# Patient Record
Sex: MALE | Race: WHITE
[De-identification: names, ages, dates, MRNs, and addresses within clinical notes are randomized per-mention and may not be internally consistent; named-entity substitution may affect disease eponyms.]

---

## 2019-04-20 ENCOUNTER — HOSPITAL ENCOUNTER (EMERGENCY)
Dept: HOSPITAL 91 - E/R | Age: 34
LOS: 3 days | Discharge: HOME | End: 2019-04-23
Payer: COMMERCIAL

## 2019-04-20 ENCOUNTER — HOSPITAL ENCOUNTER (EMERGENCY)
Dept: HOSPITAL 91 - E/R | Age: 34
Discharge: HOME | End: 2019-04-20
Payer: COMMERCIAL

## 2019-04-20 ENCOUNTER — HOSPITAL ENCOUNTER (EMERGENCY)
Dept: HOSPITAL 10 - E/R | Age: 34
LOS: 3 days | Discharge: HOME | End: 2019-04-23
Payer: COMMERCIAL

## 2019-04-20 ENCOUNTER — HOSPITAL ENCOUNTER (EMERGENCY)
Dept: HOSPITAL 10 - E/R | Age: 34
Discharge: HOME | End: 2019-04-20
Payer: COMMERCIAL

## 2019-04-20 VITALS — DIASTOLIC BLOOD PRESSURE: 78 MMHG | SYSTOLIC BLOOD PRESSURE: 114 MMHG | RESPIRATION RATE: 18 BRPM | HEART RATE: 62 BPM

## 2019-04-20 VITALS — HEIGHT: 60 IN | WEIGHT: 194.01 LBS | BODY MASS INDEX: 38.09 KG/M2

## 2019-04-20 VITALS — BODY MASS INDEX: 34.63 KG/M2 | HEIGHT: 60 IN | WEIGHT: 176.37 LBS

## 2019-04-20 DIAGNOSIS — R45.851: Primary | ICD-10-CM

## 2019-04-20 DIAGNOSIS — F17.210: ICD-10-CM

## 2019-04-20 DIAGNOSIS — R45.850: Primary | ICD-10-CM

## 2019-04-20 LAB
ACETAMINOPHEN: < 10 UG/ML (ref 10–30)
ADD MAN DIFF?: NO
ADD UMIC: NO
ALANINE AMINOTRANSFERASE: 97 IU/L (ref 13–69)
ALBUMIN/GLOBULIN RATIO: 1.09
ALBUMIN: 4.6 G/DL (ref 3.3–4.9)
ALKALINE PHOSPHATASE: 92 IU/L (ref 42–121)
AMPHETAMINE/METHAMPHETAMINE: POSITIVE
ANION GAP: 7 (ref 5–13)
ASPARTATE AMINO TRANSFERASE: 77 IU/L (ref 15–46)
BASOPHIL #: 0.1 10^3/UL (ref 0–0.1)
BASOPHILS %: 0.7 % (ref 0–2)
BILIRUBIN,DIRECT: 0 MG/DL (ref 0–0.2)
BILIRUBIN,TOTAL: 0.9 MG/DL (ref 0.2–1.3)
BLOOD UREA NITROGEN: 22 MG/DL (ref 7–20)
CALCIUM: 10 MG/DL (ref 8.4–10.2)
CARBON DIOXIDE: 26 MMOL/L (ref 21–31)
CHLORIDE: 107 MMOL/L (ref 97–110)
CREATININE: 1.14 MG/DL (ref 0.61–1.24)
EOSINOPHILS #: 0.1 10^3/UL (ref 0–0.5)
EOSINOPHILS %: 1.8 % (ref 0–7)
ETHANOL: < 10 MG/DL (ref 0–0)
GLOBULIN: 4.2 G/DL (ref 1.3–3.2)
GLUCOSE: 97 MG/DL (ref 70–220)
HEMATOCRIT: 37.4 % (ref 42–52)
HEMOGLOBIN: 12.4 G/DL (ref 14–18)
IMMATURE GRANS #M: 0.02 10^3/UL (ref 0–0.03)
IMMATURE GRANS % (M): 0.3 % (ref 0–0.43)
INR: 1.13
LYMPHOCYTES #: 1.9 10^3/UL (ref 0.8–2.9)
LYMPHOCYTES %: 28.4 % (ref 15–51)
MEAN CORPUSCULAR HEMOGLOBIN: 30.4 PG (ref 29–33)
MEAN CORPUSCULAR HGB CONC: 33.2 G/DL (ref 32–37)
MEAN CORPUSCULAR VOLUME: 91.7 FL (ref 82–101)
MEAN PLATELET VOLUME: 10.4 FL (ref 7.4–10.4)
MONOCYTE #: 1 10^3/UL (ref 0.3–0.9)
MONOCYTES %: 14.1 % (ref 0–11)
NEUTROPHIL #: 3.7 10^3/UL (ref 1.6–7.5)
NEUTROPHILS %: 54.7 % (ref 39–77)
NUCLEATED RED BLOOD CELLS #: 0 10^3/UL (ref 0–0)
NUCLEATED RED BLOOD CELLS%: 0 /100WBC (ref 0–0)
PARTIAL THROMBOPLASTIN TIME: 28.8 SEC (ref 23–35)
PLATELET COUNT: 219 10^3/UL (ref 140–415)
POTASSIUM: 3.9 MMOL/L (ref 3.5–5.1)
PROTIME: 14.6 SEC (ref 11.9–14.9)
PT RATIO: 1.1
RED BLOOD COUNT: 4.08 10^6/UL (ref 4.7–6.1)
RED CELL DISTRIBUTION WIDTH: 13.8 % (ref 11.5–14.5)
SALICYLATE: < 1 MG/DL (ref 5–30)
SODIUM: 140 MMOL/L (ref 135–144)
TOTAL PROTEIN: 8.8 G/DL (ref 6.1–8.1)
UR ASCORBIC ACID: 20 MG/DL
UR BILIRUBIN (DIP): NEGATIVE MG/DL
UR BLOOD (DIP): NEGATIVE MG/DL
UR CLARITY: CLEAR
UR COLOR: YELLOW
UR GLUCOSE (DIP): NEGATIVE MG/DL
UR KETONES (DIP): NEGATIVE MG/DL
UR LEUKOCYTE ESTERASE (DIP): NEGATIVE LEU/UL
UR NITRITE (DIP): NEGATIVE MG/DL
UR PH (DIP): 6 (ref 5–9)
UR SPECIFIC GRAVITY (DIP): 1.03 (ref 1–1.03)
UR TOTAL PROTEIN (DIP): NEGATIVE MG/DL
UR UROBILINOGEN (DIP): NEGATIVE MG/DL
WHITE BLOOD COUNT: 6.8 10^3/UL (ref 4.8–10.8)

## 2019-04-20 PROCEDURE — 99282 EMERGENCY DEPT VISIT SF MDM: CPT

## 2019-04-20 PROCEDURE — 80307 DRUG TEST PRSMV CHEM ANLYZR: CPT

## 2019-04-20 PROCEDURE — 85610 PROTHROMBIN TIME: CPT

## 2019-04-20 PROCEDURE — 80053 COMPREHEN METABOLIC PANEL: CPT

## 2019-04-20 PROCEDURE — 85025 COMPLETE CBC W/AUTO DIFF WBC: CPT

## 2019-04-20 PROCEDURE — 81003 URINALYSIS AUTO W/O SCOPE: CPT

## 2019-04-20 PROCEDURE — 96374 THER/PROPH/DIAG INJ IV PUSH: CPT

## 2019-04-20 PROCEDURE — 99285 EMERGENCY DEPT VISIT HI MDM: CPT

## 2019-04-20 PROCEDURE — 85730 THROMBOPLASTIN TIME PARTIAL: CPT

## 2019-04-20 NOTE — ERD
ER Documentation


Chief Complaint


Chief Complaint





headache and neck pain after trying to hand himself in detention with shirt





HPI


This is a 33-year-old male that had been taken to [place] detention.  While he was 


there the patient stated he want to kill himself.  He attempted to hang himself 


in detention with a shirt however this was stopped by police as they noticed the 


patient wrapping the shirt around his neck.  There is no coughing choking or 


gagging episode is again the attendant was stopped by police.  The patient is 


expressing suicidal thoughts and has the ideation of wanting to hang himself.  


The patient contrary to the triage note is denying a headache or neck pain.  He 


has had no fevers or shaking or chills.  [employer] place the patient on a hold.





ROS


All systems reviewed and are negative except as per history of present illness.





Allergies


Allergies:  


Coded Allergies:  


     No Known Allergy (Unverified , 4/20/19)





PMhx/Soc


Medical and Surgical Hx:  pt denies Medical Hx, pt denies Surgical Hx


Hx Alcohol Use:  Yes (OCCATIONAL)


Hx Substance Use:  No


Hx Tobacco Use:  Yes (OCCATIONAL)


Smoking Status:  Light tobacco smoker





Physical Exam


Vitals





Vital Signs


  Date      Temp  Pulse  Resp  B/P (MAP)   Pulse Ox  O2          O2 Flow    FiO2


Time                                                 Delivery    Rate


   4/20/19  98.8     70    16      126/84        99


     14:00                           (98)





Physical Exam


Constitutional:Well-developed. Well-nourished.


HEENT:Normocephalic. Atraumatic.Pupils were equal round reactive to light. Moist


mucous membranes.No tonsillar exudates.


Neck: No nuchal rigidity. No lymphadenopathy. No posterior cervical spine 


tenderness or step-offs.


Respiratory: Not using accessory muscles of respiration.Lungs were clear to 


auscultation bilaterally. No rhonchi. No rales. No wheezing. 


Cardiovascular: Regular rate regular rhythm.No murmurs. No rubs were 


appreciated.S1, S2 normal. Distal pulses are palpable 2+ bilaterally.


GI: Abdomen was soft. Nontender. Non Distended. No pulsatile abdominal masses or


bruits. No rebound. No guarding. Bowel sounds were present and normal. 


Muscle skeletal: Full range of motion of both the upper and lower extremities 


bilaterally.Normal muscle tone.No assymetrical calf tenderness or swelling. 


Skin: No petechia, no purpura. No lesions on the palms or the soles of the feet.


No maculopapular rash.


NEURO: Patient was alert, awake, orientated x3.No facial droop. Gait observed 


and normal with no ataxia.Speech had regular rate and rhythm. No focal n


eurological deficits.


PSYCH: Patient has suicidal thoughts and ideations.  Patient makes poor eye co


ntact.  Reluctant to answer most questions


Result Diagram:  


4/20/19 1426                                                                    


           4/20/19 1426





Results 24 hrs





Laboratory Tests


      Test
                                 4/20/19
14:26    4/20/19
16:55


      White Blood Count                      6.8 10^3/ul


      Red Blood Count                       4.08 10^6/ul


      Hemoglobin                               12.4 g/dl


      Hematocrit                                  37.4 %


      Mean Corpuscular Volume                    91.7 fl


      Mean Corpuscular Hemoglobin                30.4 pg


      Mean Corpuscular Hemoglobin
Concent     33.2 g/dl 
  



      Red Cell Distribution Width                 13.8 %


      Platelet Count                         219 10^3/UL


      Mean Platelet Volume                       10.4 fl


      Immature Granulocytes %                    0.300 %


      Neutrophils %                               54.7 %


      Lymphocytes %                               28.4 %


      Monocytes %                                 14.1 %


      Eosinophils %                                1.8 %


      Basophils %                                  0.7 %


      Nucleated Red Blood Cells %            0.0 /100WBC


      Immature Granulocytes #              0.020 10^3/ul


      Neutrophils #                          3.7 10^3/ul


      Lymphocytes #                          1.9 10^3/ul


      Monocytes #                            1.0 10^3/ul


      Eosinophils #                          0.1 10^3/ul


      Basophils #                            0.1 10^3/ul


      Nucleated Red Blood Cells #            0.0 10^3/ul


      Prothrombin Time                          14.6 Sec


      Prothrombin Time Ratio                         1.1


      INR International Normalized
Ratio           1.13 
  



      Activated Partial
Thromboplast Time      28.8 Sec 
  



      Sodium Level                            140 mmol/L


      Potassium Level                         3.9 mmol/L


      Chloride Level                          107 mmol/L


      Carbon Dioxide Level                     26 mmol/L


      Anion Gap                                        7


      Blood Urea Nitrogen                       22 mg/dl


      Creatinine                              1.14 mg/dl


      Est Glomerular Filtrat Rate
mL/min   > 60 mL/min 
   



      Glucose Level                             97 mg/dl


      Calcium Level                           10.0 mg/dl


      Total Bilirubin                          0.9 mg/dl


      Direct Bilirubin                        0.00 mg/dl


      Indirect Bilirubin                       0.9 mg/dl


      Aspartate Amino Transf
(AST/SGOT)         77 IU/L 
  



      Alanine Aminotransferase
(ALT/SGPT)       97 IU/L 
  



      Alkaline Phosphatase                       92 IU/L


      Total Protein                             8.8 g/dl


      Albumin                                   4.6 g/dl


      Globulin                                 4.20 g/dl


      Albumin/Globulin Ratio                        1.09


      Salicylates Level                    < 1.0 mg/dl


      Acetaminophen Level                  < 10.0 ug/ml


      Ethyl Alcohol Level                  < 10.0 mg/dl


      Urine Color                                          YELLOW


      Urine Clarity                                        CLEAR


      Urine pH                                                        6.0


      Urine Specific Gravity                                        1.026


      Urine Ketones                                        NEGATIVE mg/dL


      Urine Nitrite                                        NEGATIVE mg/dL


      Urine Bilirubin                                      NEGATIVE mg/dL


      Urine Urobilinogen                                   NEGATIVE mg/dL


      Urine Leukocyte Esterase
            
               NEGATIVE
Christiano/ul


      Urine Hemoglobin                                     NEGATIVE mg/dL


      Urine Glucose                                        NEGATIVE mg/dL


      Urine Total Protein                                  NEGATIVE mg/dl


      Urine Opiates Screen                                 Negative


      Urine Barbiturates                                   Negative


      Urine Amphetamines Screen                            POSITIVE


      Urine Benzodiazepines Screen                         Negative


      Urine Cocaine Screen                                 Negative


      Urine Cannabinoids                                   Positive








Procedures/MDM


The patient presented to the emergency department with an active suicidal ideati


on. My differential diagnosis included but was not limited to major depressive 


disorder, normal despondency, bipolar disorder, schizophrenia, anxiety disorder,


borderline personality disorder, antisocial personality disorder, organic mental


disorder, bereavement or alcohol or drug abuse. 





Ancillary lab work was obtained including blood alcohol level, drug screen and 


serum toxicology panel. The patient was provided a safe environment while in the


emergency department with appropriate supervision. The patient is currently 


waiting for PET team to come and evaluate the patient as the patient has been 


placed on a 5150 by [employer] and will be watched in the emergency department with 


suicide precautions until placement.  Patient no severe electrolyte 


abnormalities.





Departure


Diagnosis:  


   Primary Impression:  


   Suicide threat or attempt


Condition:  Serious











ANIA CHAWLA MD            Apr 20, 2019 19:42

## 2019-04-20 NOTE — ERD
ER Documentation


Chief Complaint


Chief Complaint





HERE FOR MEDICAL CLEARANCE FOR [facility] BOOKING. NO MEDICAL ISSUES.





HPI


Patient is here with [employer] to be cleared for booking.  The patient was arrested 


last night and was in prison.  When he was getting discharged he mentioned to the 


nurse that he wants to hurt people.  The [employer] are going to take him to [facility] facility and they want him here for okay to booking.  Patient has 


no complaints at all





ROS


All systems reviewed and are negative except as per history of present illness.





FmHx


Family History:  No coronary disease





Physical Exam


Vitals





Vital Signs


  Date      Temp  Pulse  Resp  B/P (MAP)   Pulse Ox  O2          O2 Flow    FiO2


Time                                                 Delivery    Rate


   4/20/19  98.0     62    18      114/78        97


     12:22                           (90)





Physical Exam


Const:   No acute distress


Head:   Atraumatic 


Eyes:    Normal Conjunctiva


ENT:    Normal External Ears, Nose and Mouth.


Neck:               Full range of motion. No meningismus.


Resp:   Clear to auscultation bilaterally


Cardio:   Regular rate and rhythm, no murmurs


Abd:    Soft, non tender, non distended. Normal bowel sounds


Skin:   No petechiae or rashes


Back:   No midline or flank tenderness


Ext:    No cyanosis, or edema


Neur:   Awake and alert


Psych:    Normal Mood and Affect





Procedures/MDM


Patient has been cleared for booking to be transferred to [facility] 


facility for homicidal thoughts





Departure


Diagnosis:  


   Primary Impression:  


   Homicidal ideation


   Additional Impression:  


   Encounter for wellness examination


Condition:  Stable


Patient Instructions:  Psychosis











MARIE FREEMAN DO         Apr 20, 2019 12:27

## 2019-04-22 RX ADMIN — SILVER SULFADIAZINE 1 APPLIC: 10 CREAM TOPICAL at 19:46

## 2019-04-22 RX ADMIN — LORAZEPAM 1 MG: 2 INJECTION, SOLUTION INTRAMUSCULAR; INTRAVENOUS at 20:56

## 2019-04-22 RX ADMIN — LORAZEPAM 1 MG: 1 TABLET ORAL at 08:49

## 2019-04-22 NOTE — PSY
Date/Time of Note


Date/Time of Note


DATE: 4/22/19 


TIME: 06:59





Psychiatric Subjective Eval


Consent


Pt consented to telemedicine:  Yes





Subjective Evaluation


Patient location:  emergency


Chief Complaint:  headache and neck pain after trying to hand himself in detention 


with shirt


Medical history


Problems


Medical Problems:


(1) Encounter for wellness examination


Status: Acute  





(2) Homicidal ideation


Status: Acute  





(3) Suicide threat or attempt


Status: Acute  








Allergies:  


Coded Allergies:  


     No Known Allergy (Unverified , 4/20/19)





Psychiatric Objective Eval


Mental Status Examination:


Laboratory Results





Laboratory Tests


      Test
                                 4/20/19
14:26    4/20/19
16:55


      White Blood Count                      6.8 10^3/ul


      Red Blood Count                       4.08 10^6/ul


      Hemoglobin                               12.4 g/dl


      Hematocrit                                  37.4 %


      Mean Corpuscular Volume                    91.7 fl


      Mean Corpuscular Hemoglobin                30.4 pg


      Mean Corpuscular Hemoglobin
Concent     33.2 g/dl 
  



      Red Cell Distribution Width                 13.8 %


      Platelet Count                         219 10^3/UL


      Mean Platelet Volume                       10.4 fl


      Immature Granulocytes %                    0.300 %


      Neutrophils %                               54.7 %


      Lymphocytes %                               28.4 %


      Monocytes %                                 14.1 %


      Eosinophils %                                1.8 %


      Basophils %                                  0.7 %


      Nucleated Red Blood Cells %            0.0 /100WBC


      Immature Granulocytes #              0.020 10^3/ul


      Neutrophils #                          3.7 10^3/ul


      Lymphocytes #                          1.9 10^3/ul


      Monocytes #                            1.0 10^3/ul


      Eosinophils #                          0.1 10^3/ul


      Basophils #                            0.1 10^3/ul


      Nucleated Red Blood Cells #            0.0 10^3/ul


      Prothrombin Time                          14.6 Sec


      Prothrombin Time Ratio                         1.1


      INR International Normalized
Ratio           1.13 
  



      Activated Partial
Thromboplast Time      28.8 Sec 
  



      Sodium Level                            140 mmol/L


      Potassium Level                         3.9 mmol/L


      Chloride Level                          107 mmol/L


      Carbon Dioxide Level                     26 mmol/L


      Anion Gap                                        7


      Blood Urea Nitrogen                       22 mg/dl


      Creatinine                              1.14 mg/dl


      Est Glomerular Filtrat Rate
mL/min   > 60 mL/min 
   



      Glucose Level                             97 mg/dl


      Calcium Level                           10.0 mg/dl


      Total Bilirubin                          0.9 mg/dl


      Direct Bilirubin                        0.00 mg/dl


      Indirect Bilirubin                       0.9 mg/dl


      Aspartate Amino Transf
(AST/SGOT)         77 IU/L 
  



      Alanine Aminotransferase
(ALT/SGPT)       97 IU/L 
  



      Alkaline Phosphatase                       92 IU/L


      Total Protein                             8.8 g/dl


      Albumin                                   4.6 g/dl


      Globulin                                 4.20 g/dl


      Albumin/Globulin Ratio                        1.09


      Salicylates Level                    < 1.0 mg/dl


      Acetaminophen Level                  < 10.0 ug/ml


      Ethyl Alcohol Level                  < 10.0 mg/dl


      Urine Color                                          YELLOW


      Urine Clarity                                        CLEAR


      Urine pH                                                        6.0


      Urine Specific Gravity                                        1.026


      Urine Ketones                                        NEGATIVE mg/dL


      Urine Nitrite                                        NEGATIVE mg/dL


      Urine Bilirubin                                      NEGATIVE mg/dL


      Urine Urobilinogen                                   NEGATIVE mg/dL


      Urine Leukocyte Esterase
            
               NEGATIVE
Christiano/ul


      Urine Hemoglobin                                     NEGATIVE mg/dL


      Urine Glucose                                        NEGATIVE mg/dL


      Urine Total Protein                                  NEGATIVE mg/dl


      Urine Opiates Screen                                 Negative


      Urine Barbiturates                                   Negative


      Urine Amphetamines Screen                            POSITIVE


      Urine Benzodiazepines Screen                         Negative


      Urine Cocaine Screen                                 Negative


      Urine Cannabinoids                                   Positive








Assessment and Plan


Recommendation/Plan


Discharge Disposition:  Psychiatric inpatient


Legal Status:  Place involuntary hold





Assessment


Additional comments:


IDENTIFYING INFORMATION:  33 year old  Male patient who is currently 


located at the hospital and for whom psychiatric consultation was requested. 





SOURCES OF INFORMATION: The patient who appears to be somewhat reliable and the 


medical records; the nursing staff.





CHIEF COMPLAINT: "homicidal, then suicide attempt".





HISTORY OF PRESENT ILLNESS:


The patient was interviewed via telemedicine in the presence of and under the 


supervision of nursing staff of the hospital. The consent to conducting this 


interview via telemedicine was obtained by the nursing staff at the hospital. 





The patient reports having AH, HI, and he tried to choke himself with a towel 2 


days ago,  feels anxiety, reports that he wanted to defend himself against 


anyone who was after him, especially gangs who are after him. 


He reports that he was arrested because he was walking with a knife going to his


uncle's house. 





The patient denies using alcohol heavily or regularly. 


The patient reports using MJ and meth occasionally. Last use was  few days ago. 


The patient denies using any other substances.





In terms of past psychiatric history, the patient reports having a history of 


past psychiatric hospitalizations. The patient reports having a history of no 


past suicide attempts.





PAST MEDICAL HISTORY: 


asthma, acute pancreatitis, gastroparesis, carpal tunnel.





HOME MEDICATIONS:


omeprazole, naproxen, albuterol prn.





ALLERGIES TO MEDICATIONS: 


NKDA.





LABORATORY TESTS: 


CBC with H/H 12.4/37.4,


CMP with BUN 22, AST 77, ALT 97,


UDS + meth, MJ,


alcohol level -.





SOCIAL HISTORY: 


homeless, , has 2 kids, no access to firearms.





REVIEW OF SYSTEMS:


Constitutional (e.g., fever, weight loss): negative;   


Eyes, Ears, Nose, Mouth, Throat: negative;


Cardiovascular: negative;


Respiratory: negative;


Gastrointestinal: negative;


Genitourinary: negative;


Musculoskeletal: negative;


Integumentary (skin and/or breast): negative; 


Neurological: negative;


Psychiatric: as per HPI;


Endocrine: negative;


Hematologic/Lymphatic: negative; 


Allergic/Immunologic: negative.





MENTAL STATUS EXAMINATION: 


General Appearance and Behavior: Calm, cooperative with the interview, pleasant 


with the current interviewer, makes fair eye contact, fairly groomed, no 


abnormal movements noted,


Speech: Regular rate, regular rhythm, normal latency, normal volume, somewhat 


decreased amount,


Flow of thought: sequential, logical, goal-directed,


Content of thought: + auditory hallucinations, no visual hallucinations, + 


delusions, positive for suicidal ideation; + homicidal ideation, 


Mood: "depressed",


Affect: dysthymic, dysphoric, not reactive,


Attention: normal based on the interview,


Insight: fair,


Judgment: poor,


Memory: normal based on the interview,


Sensorium: alert and oriented to person, place and date.





ASSESSMENT:


The patient's presentation and history are consistent with the diagnosis of 


unspecified psychotic disorder, stimulant use disorder, cannabis use disorder. 


The patient presents with an exacerbation of psychosis in the context of 


medication noncompliance, psychosocial stressors and substance use.





PLAN: 


- Medication management: 


Would start risperidone 1 mg po qhs.





Would start haloperidol 5 mg IM PRN severe agitation q4 hours.


Would start diphenhydramine 50 mg IM PRN severe agitation q4 hours.


Would start lorazepam 2 mg IM PRN severe agitation q4 hours





Will defer to the inpatient psychiatry team for other medication changes. 





- Labs:


No other laboratory tests are needed at this time.





- Psychotherapy: Provided supportive psychotherapy and psychoeducation. 





- Disposition:


Would recommend involuntary admission to the inpatient psychiatric unit given 


the severity of the patient's psychiatric condition and the fact that the 


patient is an imminent danger to self and/or others so long as the patient has 


been cleared medically for admission to psychiatry. Inpatient psychiatric 


admission is at this time the least restrictive environment where the patient 


can receive the psychiatric care that is needed. Would place on suicide 


precautions. The patient fulfills criteria for being placed on an involuntary 


hold for being a danger to self and others due to a psychiatric disorder. 


Discussed about the above plan with Dr. Mckinnon.











MELIDA HOWELL MD      Apr 22, 2019 07:01

## 2019-04-22 NOTE — EN
Date/Time of Note


Date/Time of Note


DATE: 4/22/19 


TIME: 03:09





ER Progress Note


Psychiatric Observation Note:


Indication: Psychosis


Duration: Greater than 22 hours


Family history: As documented in original HPI





The patient was observed with serial exams over the above timeframe.  The 


patient continued to be well-appearing, and observation continued without 


complication.  All other needs have been met during emergency department stay.





Routine psychiatric medications ordered: Still pending psychiatric evaluation





Hold status: Patient is still pending telemetry medicine psychiatry evaluation. 


He continued to be disorganized and agitated 


Placement status: Pending evaluation











DMITRY SUNSHINE             Apr 22, 2019 03:09

## 2019-04-23 VITALS — RESPIRATION RATE: 19 BRPM | HEART RATE: 78 BPM | SYSTOLIC BLOOD PRESSURE: 109 MMHG | DIASTOLIC BLOOD PRESSURE: 53 MMHG

## 2019-04-23 RX ADMIN — LORAZEPAM 1 MG: 2 INJECTION, SOLUTION INTRAMUSCULAR; INTRAVENOUS at 08:48
